# Patient Record
Sex: MALE | Race: WHITE
[De-identification: names, ages, dates, MRNs, and addresses within clinical notes are randomized per-mention and may not be internally consistent; named-entity substitution may affect disease eponyms.]

---

## 2020-08-05 ENCOUNTER — HOSPITAL ENCOUNTER (EMERGENCY)
Dept: HOSPITAL 39 - ER | Age: 30
Discharge: HOME | End: 2020-08-05
Payer: SELF-PAY

## 2020-08-05 VITALS — TEMPERATURE: 98.6 F

## 2020-08-05 VITALS — DIASTOLIC BLOOD PRESSURE: 81 MMHG | SYSTOLIC BLOOD PRESSURE: 130 MMHG

## 2020-08-05 VITALS — OXYGEN SATURATION: 97 %
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
## 2020-08-05 DIAGNOSIS — T15.11XA: Primary | ICD-10-CM

## 2020-08-05 NOTE — ED.PDOC
History of Present Illness





- General


Chief Complaint: Eye Problems


Stated Complaint: got debris in right eye


Time Seen by Provider: 08/05/20 21:06


Source: patient


Exam Limitations: no limitations





- History of Present Illness


Initial Comments: 





The patient is a 30-year-old  male presented emergency room secondary 

to the feeling that he got something in his right eye.  He had apparently been 

working on a muffler when some debris got knocked out of it and went in his eye.

 This occurred about an hour and a half ago.  There is generalized erythema 

where he has been rubbing it.  No real visual acuity defect.  No halos.  It is 

uncomfortable but not really pain.  Pupils are equally round and reactive.


Timing/Duration: 1 hour


Severity: moderate


Improving Factors: nothing


Worsening Factors: nothing


Associated Symptoms: denies symptoms





Review of Systems





- Review of Systems


Constitutional: States: no symptoms reported


EENTM: States: see HPI


Respiratory: States: no symptoms reported


Cardiology: States: no symptoms reported


Gastrointestinal/Abdominal: States: no symptoms reported


Genitourinary: States: no symptoms reported


Musculoskeletal: States: no symptoms reported


Skin: States: no symptoms reported


Neurological: States: no symptoms reported


Endocrine: States: no symptoms reported


Hematologic/Lymphatic: States: no symptoms reported


All other Systems: No Change from Baseline





Past Medical History (General)





- Patient Medical History


Hx Seizures: No


Hx Stroke: No


Hx Dementia: No


Hx Asthma: No


Hx of COPD: No


Hx Cardiac Disorders: No


Hx Congestive Heart Failure: No


Hx Pacemaker: No


Hx Hypertension: Yes - hx of


Hx Thyroid Disease: No


Hx Diabetes: No


Hx Gastroesophageal Reflux: No


Hx Renal Disease: No


Hx Cancer: No


Hx of HIV: No


Hx Hepatitis C: No


Hx MRSA: No


Surgical History: no surgical history





- Vaccination History


Hx Tetanus, Diphtheria Vaccination: Yes


Hx Influenza Vaccination: Yes


Hx Pneumococcal Vaccination: No


Immunizations Up to Date: Yes





- Social History


Hx Tobacco Use: Yes


Hx Chewing Tobacco Use: No


Hx Alcohol Use: Yes - 1 beer tonight


Hx Substance Use: No


Hx Substance Use Treatment: No


Hx Depression: No


Feels Threatened In Home Enviroment: No


Feels Threatened In a Relationship: No


Hx Physical Abuse: No


Hx Emotional Abuse: No


Hx Suspected Abuse: No





- Activities of Daily Living


Hospice Agency (if applicable):: None





- Female History


Patient is a Female of Child Bearing Age (10 -59 yrs old): No





Family Medical History





- Family History


  ** Mother


Family History: No Known





Physical Exam





- Physical Exam


General Appearance: Alert, No apparent distress


Eye Exam: right other, left normal


Ears, Nose, Throat: hearing grossly normal


Neck: full range of motion


Respiratory: no respiratory distress, no accessory muscle use


Cardiovascular/Chest: normal peripheral pulses, no edema


Peripheral Pulses: radial,right: 2+, radial,left: 2+


Rectal Exam: deferred


Extremity: normal range of motion, normal capillary refill


Neurologic: CNs II-XII nml as tested, alert, normal mood/affect, oriented x 3


Skin Exam: normal color


Comments: 





                               Vital Signs - 24 hr











  08/05/20





  20:45


 


Temperature 98.1 F


 


Pulse Rate [ 67





pulse ox] 


 


Respiratory 18





Rate 


 


Blood Pressure 118/89





[Left Arm] 


 


O2 Sat by Pulse 97





Oximetry 














Progress





- Progress


Progress: 





08/05/20 21:08


The patient is a 30-year-old  male presented emergency room with 

feeling of a foreign body in his right eye for the last hour and a half.  After 

tetracaine drops were applied, a magnifier was used and a hair with attached 

debris was found along the lower eyelid.  It was removed with a cotton tip swab.

 Patient tolerated this well.  I see no evidence of any significant corneal 

abrasion or eye injury otherwise.  Motrin can be used for discomfort tonight.  

ER warnings are given.





reddy jackson [phone]





Departure





- Departure


Clinical Impression: 


Foreign body, eye


Qualifiers:


 Encounter type: initial encounter Laterality: right Qualified Code(s): T15.91XA

- Foreign body on external eye, part unspecified, right eye, initial encounter





Disposition: Discharge to Home or Self Care


Condition: Fair


Departure Forms:  ED Discharge - Pt. Copy, Patient Portal Self Enrollment


Instructions:  DI for Eye Pain


Diet: regular diet


Activity: increase activity as tolerated


Additional Instructions: 


The patient is a 30-year-old  male presented emergency room with 

feeling of a foreign body in his right eye for the last hour and a half.  After 

tetracaine drops were applied, a magnifier was used and a hair with attached 

debris was found along the lower eyelid.  It was removed with a cotton tip swab.

 Patient tolerated this well.  I see no evidence of any significant corneal 

abrasion or eye injury otherwise.  Motrin can be used for discomfort tonight.  

ER warnings are given.

## 2020-08-10 ENCOUNTER — HOSPITAL ENCOUNTER (EMERGENCY)
Dept: HOSPITAL 39 - ER | Age: 30
Discharge: HOME | End: 2020-08-10
Payer: SELF-PAY

## 2020-08-10 VITALS — TEMPERATURE: 98.3 F | OXYGEN SATURATION: 99 %

## 2020-08-10 VITALS — DIASTOLIC BLOOD PRESSURE: 88 MMHG | SYSTOLIC BLOOD PRESSURE: 129 MMHG
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
## 2020-08-10 DIAGNOSIS — Z87.891: ICD-10-CM
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
## 2020-08-10 DIAGNOSIS — T15.12XA: Primary | ICD-10-CM

## 2020-08-10 NOTE — ED.PDOC
History of Present Illness





- General


Chief Complaint: Eye Problems


Stated Complaint: something in left eye


Time Seen by Provider: 08/10/20 19:25


Source: patient





- History of Present Illness


Initial Comments: 





30-year-old male who presents with chief complaint of left eye pain.  Onset 

about 2 hours ago while at work.  Patient works as a  and was walking 

through the shop when pain began.  He believes possibly a tiny metal shaving got

into the eye.  Reports sensation of foreign body at the approximately 1 o'clock 

position of the upper eye.  Reports constant moderate irritating pain, worse 

when he blinks and looks to the left.  He tried flushing the eye with some water

without relief.  Denies any redness or swelling or discharge.  Denies any blurry

vision.  He has not taken any medication for pain.





Allergies/Adverse Reactions: 


Allergies





NO KNOWN ALLERGY Allergy (Verified 08/10/20 19:30)


   





Home Medications: 


Ambulatory Orders





NK  08/10/20 











Review of Systems





- Review of Systems


Review of Systems: 





08/10/20 19:58


as per HPI





All other Systems: Reviewed and Negative





Past Medical History (General)





- Patient Medical History


Hx Seizures: No


Hx Stroke: No


Hx Dementia: No


Hx Asthma: No


Hx of COPD: No


Hx Cardiac Disorders: No


Hx Congestive Heart Failure: No


Hx Pacemaker: No


Hx Hypertension: Yes - hx of


Hx Thyroid Disease: No


Hx Diabetes: No


Hx Gastroesophageal Reflux: No


Hx Renal Disease: No


Hx Cancer: No


Hx of HIV: No


Hx Hepatitis C: No


Hx MRSA: No





- Vaccination History


Hx Tetanus, Diphtheria Vaccination: Yes


Hx Influenza Vaccination: Yes


Hx Pneumococcal Vaccination: No





- Social History


Hx Tobacco Use: Yes


Hx Chewing Tobacco Use: No


Hx Alcohol Use: Yes - 1 beer tonight


Hx Substance Use: No


Hx Substance Use Treatment: No


Hx Depression: No


Hx Physical Abuse: No


Hx Emotional Abuse: No


Hx Suspected Abuse: No





Family Medical History





- Family History


  ** Mother


Family History: No Known





Physical Exam





- Physical Exam


General Appearance: Alert, Comfortable, No apparent distress


Eye Exam: right normal, left other - <1 mm speck foreign body noted to be 

embedded in the lateral aspect of the conjunctiva of the left upper eyelid.  No 

ocular foreign bodies or erythema noted.  Fluorescein staining revealed no 

corneal abrasions.  Visual acuity grossly normal, extraocular movements intact


Ears, Nose, Throat: normal ENT inspection, normal pharynx


Neck: normal inspection


Respiratory: lungs clear, normal breath sounds, no respiratory distress


Cardiovascular/Chest: normal peripheral pulses, regular rate, rhythm, no murmur


Gastrointestinal/Abdominal: non tender, soft


Back Exam: normal inspection


Extremity: normal inspection


Neurologic: CNs II-XII nml as tested, no motor/sensory deficits, alert, normal 

mood/affect, oriented x 3


Skin Exam: normal color, warm/dry





Progress





- Progress


Progress: 





08/10/20 20:01


Acute left eye pain


-Appears due to microscopic metallic foreign body to the upper eyelid 

subconjunctiva.  Consider also corneal abrasion, intraocular foreign body, 

conjunctivitis, keratitis, other.


-Copious flushing of the left eye with normal saline seemed to washout the tiny 

speck foreign body which I suspect was a microscopic metallic shaving.  No 

corneal abrasions seen present.  Will put the patient on erythromycin topical 

antibiotic ointment 4 times daily for 7 days.  Advised to follow-up tomorrow 

with optometry clinic for repeat evaluation of the eye if he is still 

experiencing any discomfort.  Discharge home in good condition, return warnings 

discussed at length.  Continue to do over-the-counter analgesics as needed for 

pain.





Pardeep Welsh MD


Billing #[id number]














Departure





- Departure


Clinical Impression: 


 Foreign body of left eyelid





Time of Disposition: 20:04


Disposition: Discharge to Home or Self Care


Condition: Good


Departure Forms:  ED Discharge - Pt. Copy, Patient Portal Self Enrollment


Instructions:  DI for Eye Pain, Foreign Body in Eye (DC)


Diet: resume usual diet


Activity: increase activity as tolerated


Home Medications: 


Ambulatory Orders





NK  08/10/20 








Additional Instructions: 


Continue to always wear protective eyewear when grinding metal or using other 

power tools or equipment.  Continue to use the antibiotic eye ointment as 

directed 4 times daily to the left eye.  May continue to flush the eye with 

artificial tears rewetting eyedrops as often as needed for eye dryness or 

discomfort.  You may also continue to use ibuprofen 600 mg every 6 hours as 

needed for pain in addition to Tylenol as needed.  If you continue to have pain 

or discomfort or sensation of foreign body in the left eye in the next 12 to 24 

hours follow-up with the optometrist in town for repeat eye exam or you may also

return to the emergency room for further evaluation.  Also return to the ED if 

you develop any other concerning symptoms such as rapidly worsening redness or 

discharge from the eye, visual disturbance, rapidly worsening or severe 

headache, etc.  Otherwise follow-up with your primary care physician as 

scheduled or sooner as needed.